# Patient Record
Sex: FEMALE | Race: WHITE | Employment: STUDENT | URBAN - METROPOLITAN AREA
[De-identification: names, ages, dates, MRNs, and addresses within clinical notes are randomized per-mention and may not be internally consistent; named-entity substitution may affect disease eponyms.]

---

## 2024-11-16 ENCOUNTER — OFFICE VISIT (OUTPATIENT)
Age: 24
End: 2024-11-16

## 2024-11-16 VITALS
WEIGHT: 136 LBS | OXYGEN SATURATION: 98 % | BODY MASS INDEX: 23.22 KG/M2 | HEIGHT: 64 IN | DIASTOLIC BLOOD PRESSURE: 77 MMHG | HEART RATE: 119 BPM | SYSTOLIC BLOOD PRESSURE: 107 MMHG | TEMPERATURE: 98.4 F

## 2024-11-16 DIAGNOSIS — Z20.822 EXPOSURE TO COVID-19 VIRUS: ICD-10-CM

## 2024-11-16 DIAGNOSIS — R05.1 ACUTE COUGH: ICD-10-CM

## 2024-11-16 DIAGNOSIS — H66.003 NON-RECURRENT ACUTE SUPPURATIVE OTITIS MEDIA OF BOTH EARS WITHOUT SPONTANEOUS RUPTURE OF TYMPANIC MEMBRANES: ICD-10-CM

## 2024-11-16 DIAGNOSIS — H92.03 OTALGIA OF BOTH EARS: ICD-10-CM

## 2024-11-16 DIAGNOSIS — H69.93 EUSTACHIAN TUBE DYSFUNCTION, BILATERAL: ICD-10-CM

## 2024-11-16 DIAGNOSIS — U07.1 COVID-19: Primary | ICD-10-CM

## 2024-11-16 LAB
Lab: ABNORMAL
PERFORMING INSTRUMENT: ABNORMAL
QC PASS/FAIL: ABNORMAL
SARS-COV-2, POC: DETECTED

## 2024-11-16 RX ORDER — NALTREXONE HYDROCHLORIDE 50 MG/1
4.6 TABLET, FILM COATED ORAL DAILY
COMMUNITY

## 2024-11-16 RX ORDER — AMOXICILLIN 500 MG/1
500 CAPSULE ORAL 2 TIMES DAILY
Qty: 14 CAPSULE | Refills: 0 | Status: SHIPPED | OUTPATIENT
Start: 2024-11-16 | End: 2024-11-23

## 2024-11-16 RX ORDER — BENZONATATE 100 MG/1
100 CAPSULE ORAL 2 TIMES DAILY PRN
Qty: 14 CAPSULE | Refills: 0 | Status: SHIPPED | OUTPATIENT
Start: 2024-11-16 | End: 2024-11-23

## 2024-11-16 RX ORDER — PREDNISONE 10 MG/1
10 TABLET ORAL 2 TIMES DAILY
Qty: 10 TABLET | Refills: 0 | Status: SHIPPED | OUTPATIENT
Start: 2024-11-16 | End: 2024-11-21

## 2024-11-16 NOTE — PATIENT INSTRUCTIONS
COVID-19  Your in clinic Covid-19 test was positive  Tessalon Pearls prescribed to help with the cough  Treat symptoms with OTC remedies  Wear a mask in public  Avoid contact with people until fever free for 24 hours  Follow CDC guidelines regarding isolation  Recommend OTC treatment for symptoms:  ibuprofen (Advil, Motrin) and acetaminophen (Tylenol) for fevers and pain relief.  Antihistamines (Claritin, Zyrtec, Allegra, or Xyzal) and nasal steroid sprays (such as Flonase) to help with nasal congestion and runny nose.  Saline Mist Sprays such as [employer] Hammer Simply Saline to loosen and clear secretions  throat sprays (Cepacol, chloraseptic) for throat pain.  throat lozenges, and increased water intake for cough.  honey (1-2 teaspoons every hour) for relief of throat irritation and coughing fits.  warm teas, humidifiers, nasal lavages, and sleeping in an inclined position are also helpful options that can lessen symptoms.  Otitis Media of both ears  Amoxicillin is prescribed for antibiotic treatment of the ear infection  Take the antibiotics until completed, do not stop unless otherwise directed by a healthcare provider.  Recommend daily yogurt or other probiotics while on antibiotics.  Prednisone prescribed to help with the eustachian tube dysfunction- do not take ibuprofen or other NSAIDs while taking steroid  Recommend OTC treatment for symptoms:  ibuprofen (Advil, Motrin) and acetaminophen (Tylenol) for fevers and pain relief.  Recommend warm compresses over the symptomatic ear(s) for 10-15 minutes, or a hot shower, followed by 1-2 minutes of massaging the area behind your ears and down the jaw-line to help with the ear congestion.

## 2024-11-16 NOTE — PROGRESS NOTES
Mary Teresa (: 2000) is a 24 y.o. female, New patient, here for evaluation of the following chief complaint(s):  Ear Pain (Bilateral ear pain and fullness, hedaches, sinus congestion, cough x wednesday)      ASSESSMENT/PLAN:    ICD-10-CM    1. COVID-19  U07.1       2. Non-recurrent acute suppurative otitis media of both ears without spontaneous rupture of tympanic membranes  H66.003 amoxicillin (AMOXIL) 500 MG capsule      3. Exposure to COVID-19 virus  Z20.822 POCT COVID-19, Antigen      4. Otalgia of both ears  H92.03 predniSONE (DELTASONE) 10 MG tablet      5. Eustachian tube dysfunction, bilateral  H69.93       6. Acute cough  R05.1           COVID-19  Your in clinic Covid-19 test was positive  Tessalon Pearls prescribed to help with the cough  Treat symptoms with OTC remedies  Wear a mask in public  Avoid contact with people until fever free for 24 hours  Follow CDC guidelines regarding isolation  Recommend OTC treatment for symptoms:    Otitis Media of both ears  Amoxicillin is prescribed for antibiotic treatment of the ear infection  Take the antibiotics until completed, do not stop unless otherwise directed by a healthcare provider.  Recommend daily yogurt or other probiotics while on antibiotics.  Prednisone prescribed to help with the eustachian tube dysfunction- do not take ibuprofen or other NSAIDs while taking steroid  Recommend OTC treatment for symptoms:  Discussed PCP follow up for persisting or worsening symptoms, or to return to the clinic if unable to obtain PCP follow up for worsening symptoms.    The patient tolerated their visit well. The patient and/or the family were informed of the results of any tests, a time was given to answer questions, a plan was proposed and they agreed with plan. Reviewed AVS with treatment instructions and answered questions - pt/family expresses understanding and agreement with the discussed treatment plan and AVS